# Patient Record
Sex: FEMALE | URBAN - METROPOLITAN AREA
[De-identification: names, ages, dates, MRNs, and addresses within clinical notes are randomized per-mention and may not be internally consistent; named-entity substitution may affect disease eponyms.]

---

## 2022-05-26 ENCOUNTER — OFFICE VISIT (OUTPATIENT)
Dept: URBAN - METROPOLITAN AREA CLINIC 23 | Facility: CLINIC | Age: 32
End: 2022-05-26
Payer: COMMERCIAL

## 2022-05-26 DIAGNOSIS — H52.223 REGULAR ASTIGMATISM, BILATERAL: ICD-10-CM

## 2022-05-26 DIAGNOSIS — H31.012 MACULA SCAR OF POST POLE OF LEFT EYE: Primary | ICD-10-CM

## 2022-05-26 PROCEDURE — 92134 CPTRZ OPH DX IMG PST SGM RTA: CPT | Performed by: OPTOMETRIST

## 2022-05-26 PROCEDURE — 92004 COMPRE OPH EXAM NEW PT 1/>: CPT | Performed by: OPTOMETRIST

## 2022-05-26 ASSESSMENT — INTRAOCULAR PRESSURE
OD: 14
OS: 19

## 2022-05-26 ASSESSMENT — KERATOMETRY
OS: 43.00
OD: 43.38

## 2022-05-26 ASSESSMENT — VISUAL ACUITY
OS: 20/30
OD: 20/20

## 2022-05-26 NOTE — IMPRESSION/PLAN
Bedside shift report received from Annelise Najera with sbar  O.T. in room working with patient  Monet Hassan. 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 Plan: Pt edu. OCT appears show thinning OS. Monitor yearly for changes. RTC 1 year DFE.